# Patient Record
Sex: FEMALE | Race: WHITE | NOT HISPANIC OR LATINO | URBAN - METROPOLITAN AREA
[De-identification: names, ages, dates, MRNs, and addresses within clinical notes are randomized per-mention and may not be internally consistent; named-entity substitution may affect disease eponyms.]

---

## 2020-06-08 ENCOUNTER — INPATIENT (INPATIENT)
Facility: HOSPITAL | Age: 80
LOS: 3 days | Discharge: NURSING, REHAB OR LONG-TERM CARE | DRG: 493 | End: 2020-06-12
Attending: ORTHOPAEDIC SURGERY | Admitting: ORTHOPAEDIC SURGERY
Payer: MEDICARE

## 2020-06-08 VITALS
WEIGHT: 115.96 LBS | HEART RATE: 82 BPM | SYSTOLIC BLOOD PRESSURE: 118 MMHG | RESPIRATION RATE: 18 BRPM | HEIGHT: 61 IN | DIASTOLIC BLOOD PRESSURE: 72 MMHG | TEMPERATURE: 98 F | OXYGEN SATURATION: 96 %

## 2020-06-08 DIAGNOSIS — R52 PAIN, UNSPECIFIED: ICD-10-CM

## 2020-06-08 DIAGNOSIS — K21.9 GASTRO-ESOPHAGEAL REFLUX DISEASE WITHOUT ESOPHAGITIS: ICD-10-CM

## 2020-06-08 DIAGNOSIS — S42.309A UNSPECIFIED FRACTURE OF SHAFT OF HUMERUS, UNSPECIFIED ARM, INITIAL ENCOUNTER FOR CLOSED FRACTURE: ICD-10-CM

## 2020-06-08 LAB
ALBUMIN SERPL ELPH-MCNC: 4.2 G/DL — SIGNIFICANT CHANGE UP (ref 3.3–5)
ALP SERPL-CCNC: 67 U/L — SIGNIFICANT CHANGE UP (ref 40–120)
ALT FLD-CCNC: 20 U/L — SIGNIFICANT CHANGE UP (ref 10–45)
ANION GAP SERPL CALC-SCNC: 17 MMOL/L — SIGNIFICANT CHANGE UP (ref 5–17)
APTT BLD: 27 SEC — LOW (ref 27.5–36.3)
AST SERPL-CCNC: 25 U/L — SIGNIFICANT CHANGE UP (ref 10–40)
BASOPHILS # BLD AUTO: 0.05 K/UL — SIGNIFICANT CHANGE UP (ref 0–0.2)
BASOPHILS NFR BLD AUTO: 0.6 % — SIGNIFICANT CHANGE UP (ref 0–2)
BILIRUB SERPL-MCNC: 0.8 MG/DL — SIGNIFICANT CHANGE UP (ref 0.2–1.2)
BLD GP AB SCN SERPL QL: NEGATIVE — SIGNIFICANT CHANGE UP
BUN SERPL-MCNC: 11 MG/DL — SIGNIFICANT CHANGE UP (ref 7–23)
CALCIUM SERPL-MCNC: 9.7 MG/DL — SIGNIFICANT CHANGE UP (ref 8.4–10.5)
CHLORIDE SERPL-SCNC: 99 MMOL/L — SIGNIFICANT CHANGE UP (ref 96–108)
CO2 SERPL-SCNC: 25 MMOL/L — SIGNIFICANT CHANGE UP (ref 22–31)
CREAT SERPL-MCNC: 0.71 MG/DL — SIGNIFICANT CHANGE UP (ref 0.5–1.3)
EOSINOPHIL # BLD AUTO: 0.03 K/UL — SIGNIFICANT CHANGE UP (ref 0–0.5)
EOSINOPHIL NFR BLD AUTO: 0.4 % — SIGNIFICANT CHANGE UP (ref 0–6)
GLUCOSE SERPL-MCNC: 74 MG/DL — SIGNIFICANT CHANGE UP (ref 70–99)
HCT VFR BLD CALC: 37.2 % — SIGNIFICANT CHANGE UP (ref 34.5–45)
HGB BLD-MCNC: 11.9 G/DL — SIGNIFICANT CHANGE UP (ref 11.5–15.5)
IMM GRANULOCYTES NFR BLD AUTO: 0.4 % — SIGNIFICANT CHANGE UP (ref 0–1.5)
INR BLD: 0.97 RATIO — SIGNIFICANT CHANGE UP (ref 0.88–1.16)
LYMPHOCYTES # BLD AUTO: 2.1 K/UL — SIGNIFICANT CHANGE UP (ref 1–3.3)
LYMPHOCYTES # BLD AUTO: 26.4 % — SIGNIFICANT CHANGE UP (ref 13–44)
MCHC RBC-ENTMCNC: 30.9 PG — SIGNIFICANT CHANGE UP (ref 27–34)
MCHC RBC-ENTMCNC: 32 GM/DL — SIGNIFICANT CHANGE UP (ref 32–36)
MCV RBC AUTO: 96.6 FL — SIGNIFICANT CHANGE UP (ref 80–100)
MONOCYTES # BLD AUTO: 0.66 K/UL — SIGNIFICANT CHANGE UP (ref 0–0.9)
MONOCYTES NFR BLD AUTO: 8.3 % — SIGNIFICANT CHANGE UP (ref 2–14)
NEUTROPHILS # BLD AUTO: 5.09 K/UL — SIGNIFICANT CHANGE UP (ref 1.8–7.4)
NEUTROPHILS NFR BLD AUTO: 63.9 % — SIGNIFICANT CHANGE UP (ref 43–77)
NRBC # BLD: 0 /100 WBCS — SIGNIFICANT CHANGE UP (ref 0–0)
PLATELET # BLD AUTO: 266 K/UL — SIGNIFICANT CHANGE UP (ref 150–400)
POTASSIUM SERPL-MCNC: 3.4 MMOL/L — LOW (ref 3.5–5.3)
POTASSIUM SERPL-SCNC: 3.4 MMOL/L — LOW (ref 3.5–5.3)
PROT SERPL-MCNC: 6.9 G/DL — SIGNIFICANT CHANGE UP (ref 6–8.3)
PROTHROM AB SERPL-ACNC: 11.2 SEC — SIGNIFICANT CHANGE UP (ref 10–12.9)
RBC # BLD: 3.85 M/UL — SIGNIFICANT CHANGE UP (ref 3.8–5.2)
RBC # FLD: 13.1 % — SIGNIFICANT CHANGE UP (ref 10.3–14.5)
RH IG SCN BLD-IMP: POSITIVE — SIGNIFICANT CHANGE UP
RH IG SCN BLD-IMP: POSITIVE — SIGNIFICANT CHANGE UP
SODIUM SERPL-SCNC: 141 MMOL/L — SIGNIFICANT CHANGE UP (ref 135–145)
WBC # BLD: 7.96 K/UL — SIGNIFICANT CHANGE UP (ref 3.8–10.5)
WBC # FLD AUTO: 7.96 K/UL — SIGNIFICANT CHANGE UP (ref 3.8–10.5)

## 2020-06-08 PROCEDURE — 73020 X-RAY EXAM OF SHOULDER: CPT | Mod: 26,LT

## 2020-06-08 PROCEDURE — 99285 EMERGENCY DEPT VISIT HI MDM: CPT | Mod: CS,GC

## 2020-06-08 PROCEDURE — 73200 CT UPPER EXTREMITY W/O DYE: CPT | Mod: 26,LT

## 2020-06-08 PROCEDURE — 71045 X-RAY EXAM CHEST 1 VIEW: CPT | Mod: 26

## 2020-06-08 PROCEDURE — 76377 3D RENDER W/INTRP POSTPROCES: CPT | Mod: 26

## 2020-06-08 PROCEDURE — 73060 X-RAY EXAM OF HUMERUS: CPT | Mod: 26,LT

## 2020-06-08 PROCEDURE — 73030 X-RAY EXAM OF SHOULDER: CPT | Mod: 26,LT

## 2020-06-08 RX ORDER — OXYCODONE HYDROCHLORIDE 5 MG/1
10 TABLET ORAL EVERY 4 HOURS
Refills: 0 | Status: DISCONTINUED | OUTPATIENT
Start: 2020-06-08 | End: 2020-06-09

## 2020-06-08 RX ORDER — POTASSIUM CHLORIDE 20 MEQ
20 PACKET (EA) ORAL
Refills: 0 | Status: COMPLETED | OUTPATIENT
Start: 2020-06-08 | End: 2020-06-08

## 2020-06-08 RX ORDER — ASCORBIC ACID 60 MG
500 TABLET,CHEWABLE ORAL
Refills: 0 | Status: DISCONTINUED | OUTPATIENT
Start: 2020-06-08 | End: 2020-06-09

## 2020-06-08 RX ORDER — FERROUS SULFATE 325(65) MG
325 TABLET ORAL
Refills: 0 | Status: DISCONTINUED | OUTPATIENT
Start: 2020-06-08 | End: 2020-06-09

## 2020-06-08 RX ORDER — OXYCODONE HYDROCHLORIDE 5 MG/1
5 TABLET ORAL EVERY 4 HOURS
Refills: 0 | Status: DISCONTINUED | OUTPATIENT
Start: 2020-06-08 | End: 2020-06-09

## 2020-06-08 RX ORDER — MAGNESIUM HYDROXIDE 400 MG/1
30 TABLET, CHEWABLE ORAL DAILY
Refills: 0 | Status: DISCONTINUED | OUTPATIENT
Start: 2020-06-08 | End: 2020-06-09

## 2020-06-08 RX ORDER — FOLIC ACID 0.8 MG
1 TABLET ORAL DAILY
Refills: 0 | Status: DISCONTINUED | OUTPATIENT
Start: 2020-06-08 | End: 2020-06-09

## 2020-06-08 RX ORDER — HYDROMORPHONE HYDROCHLORIDE 2 MG/ML
0.5 INJECTION INTRAMUSCULAR; INTRAVENOUS; SUBCUTANEOUS EVERY 6 HOURS
Refills: 0 | Status: DISCONTINUED | OUTPATIENT
Start: 2020-06-08 | End: 2020-06-09

## 2020-06-08 RX ORDER — SODIUM CHLORIDE 9 MG/ML
1000 INJECTION, SOLUTION INTRAVENOUS
Refills: 0 | Status: DISCONTINUED | OUTPATIENT
Start: 2020-06-08 | End: 2020-06-09

## 2020-06-08 RX ADMIN — Medication 20 MILLIEQUIVALENT(S): at 17:31

## 2020-06-08 RX ADMIN — Medication 20 MILLIEQUIVALENT(S): at 16:05

## 2020-06-08 RX ADMIN — Medication 500 MILLIGRAM(S): at 21:52

## 2020-06-08 RX ADMIN — Medication 20 MILLIEQUIVALENT(S): at 14:40

## 2020-06-08 RX ADMIN — Medication 325 MILLIGRAM(S): at 17:30

## 2020-06-08 RX ADMIN — HYDROMORPHONE HYDROCHLORIDE 0.5 MILLIGRAM(S): 2 INJECTION INTRAMUSCULAR; INTRAVENOUS; SUBCUTANEOUS at 15:59

## 2020-06-08 NOTE — ED PROVIDER NOTE - CARE PLAN
Principal Discharge DX:	Humeral fracture Principal Discharge DX:	Humerus head fracture, left, closed, initial encounter

## 2020-06-08 NOTE — H&P ADULT - NSHPLABSRESULTS_GEN_ALL_CORE
Imaging:  XR left shoulder/humerus demonstrates displaced two-part proximal humerus fracture involving the surgical neck. No other appreciable fractures/dislocations

## 2020-06-08 NOTE — ED ADULT NURSE NOTE - OBJECTIVE STATEMENT
79y female presents to the ED from outpatient MD c/o left arm and shoulder pain with a diagnosis of a fractured humerus. The patient lost her balance and fell at home on Thursday June 4th, visited outside hospital where xrays revealed the fracture. Patient was discharged home and referred to an outpatient MD. Patient presents with bruising on both upper and lower parts of the left arm with pain upon movement and palpation. Patient denies any numbness and maintains mobility from the elbow down. Sensation remains in tact. Patient denies taking any pain medication today. Pt is A&O x3, lung sounds clear bilaterally, abd soft, nondistended and nontender. PMH includes HEP C. 79y female presents to the ED from outpatient MD c/o left arm and shoulder pain with a diagnosis of a fractured humerus. The patient lost her balance and fell at home on Thursday June 4th, visited outside hospital where xrays revealed the fracture. Patient was discharged home and referred to an outpatient MD. Patient presents with bruising on both upper and lower parts of the left arm with pain upon movement and palpation. Patient denies any numbness and maintains mobility from the elbow down. Sensation remains in tact. Patient denies cp and SOB. Pt denies taking any pain medication today. Pt is A&O x3, lung sounds clear bilaterally, abd soft, nondistended and nontender. PMH includes HEP C.

## 2020-06-08 NOTE — ED PROVIDER NOTE - PHYSICAL EXAMINATION
Gen: well developed South African speaking female NAD   HEENT: NCAT, EOMI, no nasal discharge, mucous membranes moist  CV: RRR, +S1/S2, no M/R/G  Resp: CTAB, no W/R/R  GI: Abdomen soft non-distended, NTTP, no masses  MSK: +L proximal upper arm/shoulder with palpable deformity, +ecchymosis to left upper and forearm, +distal sensation intact in median, radial and ulnar distribution, distal pulses 2+ b/l arms, cap refill <2s +AROM of b/l phalanges intact   Neuro: A&Ox4, following commands, moving all four extremities spontaneously  Psych: appropriate mood

## 2020-06-08 NOTE — H&P ADULT - ASSESSMENT
A/P:  Patient is a 79y Female w/ left proximal humerus fx    -Plan for OR tomorrow w/ Dr. Parker for ORIF.  -NPO after midnight except medications  -IVF while NPO  -Please hold chemical DVT ppx, SCDs okay  -FU Preop labs  -Medical comanagement appreciated, please comment on patients optimization/clearance for OR  -Multimodal Analgesia - recommend low dose opioids and acetaminophen as tolerated  -NWB, LUE in sling  -Ice and elevate as tolerated  -Recommend Ca & Vit D supplementation  -Recommend DEXA scan/Osteoporosis workup outpatient and treatment as needed  -Recommend follow up w/ outpatient endocrinologist   -All Patient's and Family Member's questions answered. Patient/family understand and agree w/ plan.  -Imaging and clinical presentation discussed w/ Dr. Parker who is aware and agrees w/ above plan.    Quinten Lopez M.D.   Orthopaedic Surgery  [id number]

## 2020-06-08 NOTE — CONSULT NOTE ADULT - ASSESSMENT
80 yo woma with a hx of a fall presents with a left proximal humerus fx. Patient scheduled for Open reduction and internal fixation

## 2020-06-08 NOTE — CONSULT NOTE ADULT - SUBJECTIVE AND OBJECTIVE BOX
78 y/o F no PMH presents to the ED with c/o L arm pain, bruising and swelling s/p fall last week. Pt was seen at [facility], had cardiac w/u and XR which showed humeral fx, pt placed in sling and given outpt f/u for this week. Pt saw her PMD today who referred her to [facility] ED for eval and likely admit with Dr. Parker for surgery. Denies acutely worsening pain, numbness, tingling to arm, fevers, chills, n/v/d. Has been taking tylenol for pain at home. Patient scheduled for an Open reduction and internal fixation of the left humerus. Patient seen resting comfirtably. Denies any chest pain or shortness of breath.        PAST MEDICAL & SURGICAL HISTORY:  Constipation  No significant past surgical history        MEDICATIONS  (STANDING):  ascorbic acid 500 milliGRAM(s) Oral two times a day  dicyclomine 20 milliGRAM(s) Oral daily  ferrous    sulfate 325 milliGRAM(s) Oral three times a day with meals  folic acid 1 milliGRAM(s) Oral daily  lactated ringers. 1000 milliLiter(s) (100 mL/Hr) IV Continuous <Continuous>  multivitamin 1 Tablet(s) Oral daily  potassium chloride    Tablet ER 20 milliEquivalent(s) Oral every 2 hours    MEDICATIONS  (PRN):  HYDROmorphone  Injectable 0.5 milliGRAM(s) IV Push every 6 hours PRN Severe Pain (7 - 10)  magnesium hydroxide Suspension 30 milliLiter(s) Oral daily PRN Constipation  oxyCODONE    IR 10 milliGRAM(s) Oral every 4 hours PRN Moderate Pain (4 - 6)  oxyCODONE    IR 5 milliGRAM(s) Oral every 4 hours PRN Mild Pain (1 - 3)    Social Hx:  Tobacco: Neg  ETOH: Rare;y  Drugs: Neg    Family Hx:  As per my conversation with the patient, non contributory        ROS  CONSTITUTIONAL: No weakness, fevers or chills  EYES/ENT: No visual changes;  No vertigo or throat pain   NECK: No pain or stiffness  RESPIRATORY: No cough, wheezing, hemoptysis; No shortness of breath  CARDIOVASCULAR: No chest pain or palpitations  GASTROINTESTINAL: No abdominal or epigastric pain. No nausea, vomiting, or hematemesis; No diarrhea or constipation. No melena or hematochezia.  GENITOURINARY: No dysuria, frequency or hematuria  NEUROLOGICAL: No numbness or weakness  SKIN: No itching, burning, rashes, or lesions   MUSCULOSKELETAL: left arm pain    INTERVAL HPI/OVERNIGHT EVENTS:  T(C): 36.8 (06-08-20 @ 15:54), Max: 36.8 (06-08-20 @ 15:54)  HR: 76 (06-08-20 @ 15:54) (76 - 82)  BP: 103/66 (06-08-20 @ 15:54) (103/66 - 118/72)  RR: 18 (06-08-20 @ 15:54) (18 - 18)  SpO2: 98% (06-08-20 @ 15:54) (96% - 98%)  Wt(kg): --  I&O's Summary      PHYSICAL EXAM:  GENERAL: NAD, well-groomed, well-developed  HEAD:  Atraumatic, Normocephalic  EYES: EOMI, PERRLA, conjunctiva and sclera clear  ENMT: No tonsillar erythema, exudates, or enlargement; Moist mucous membranes, Good dentition, No lesions  NECK: Supple, No JVD, Normal thyroid  NERVOUS SYSTEM:  Alert & Oriented X3, Good concentration; Motor Strength 5/5 B/L upper and lower extremities; DTRs 2+ intact and symmetric  CHEST/LUNG: Clear to percussion bilaterally; No rales, rhonchi, wheezing, or rubs  HEART: Regular rate and rhythm; No murmurs, rubs, or gallops  ABDOMEN: Soft, Nontender, Nondistended; Bowel sounds present  EXTREMITIES:  2+ Peripheral Pulses, No clubbing, cyanosis, or edema  LYMPH: No lymphadenopathy noted  SKIN: No rashes or lesions        LABS:                        11.9   7.96  )-----------( 266      ( 08 Jun 2020 12:59 )             37.2     06-08    141  |  99  |  11  ----------------------------<  74  3.4<L>   |  25  |  0.71    Ca    9.7      08 Jun 2020 12:59    TPro  6.9  /  Alb  4.2  /  TBili  0.8  /  DBili  x   /  AST  25  /  ALT  20  /  AlkPhos  67  06-08    PT/INR - ( 08 Jun 2020 12:59 )   PT: 11.2 sec;   INR: 0.97 ratio         PTT - ( 08 Jun 2020 12:59 )  PTT:27.0 sec    CAPILLARY BLOOD GLUCOSE    EKG   NSR@ 80

## 2020-06-08 NOTE — H&P ADULT - NSHPPHYSICALEXAM_GEN_ALL_CORE
PHYSICAL EXAM:  T(C): 36.7 (06-08-20 @ 11:48), Max: 36.7 (06-08-20 @ 11:48)  HR: 82 (06-08-20 @ 11:48) (82 - 82)  BP: 118/72 (06-08-20 @ 11:48) (118/72 - 118/72)  RR: 18 (06-08-20 @ 11:48) (18 - 18)  SpO2: 96% (06-08-20 @ 11:48) (96% - 96%)    Gen: NAD, Resting, following commands  LUE:  Skin intact, no erythema, + ecchymosis about the shoulder & arm  +TTP about the shoulder  +AIN/PIN/R/M/Msc/Ax  +SILT C5-T1  + Rad pulse  Compartments soft and compressible    Secondary Survey:   No TTP over bony prominences, SILT, palpable pulses, full/painless A/PROM, compartments soft. No TTP over spinous processes or paraspinal muscles at C/T/L spine. No palpable step off. No other injuries or complaints.

## 2020-06-08 NOTE — H&P ADULT - HISTORY OF PRESENT ILLNESS
Patient is a 79yFemale community ambulator with assistive devices who presents to [facility] ED w/ a c/o of left shoulder pain s/p MF. Patient states slipped and fell on thursday. Denies HS/LOC. Localizing pain to left shoulder, denies radiation of pain elsewhere. States ability to ambulate immediately following the injury. Denies any numbness or tingling. Denies having any other pain elsewhere. Denies any previous orthopaedic history. No other orthopaedic concerns at this time.

## 2020-06-08 NOTE — ED PROVIDER NOTE - CLINICAL SUMMARY MEDICAL DECISION MAKING FREE TEXT BOX
78 y/o F no PMH presents to the ED from her PMD with left humeral sp fall last week, likely admit to Dr. Parker for surgery. Denies acutely worsening pain, numbness, tingling to arm, fevers, chills, n/v/d. Has been taking tylenol for pain at home. Compartments soft, distal sensation intact and equal, radial pulses intact and equal b/l. DDx humeral fx, dislocation, contusion, low suspicion compartment syndrome. plan labs xrays ortho consult & admit

## 2020-06-08 NOTE — ED PROVIDER NOTE - ATTENDING CONTRIBUTION TO CARE
I, Stan Edwards, performed a history and physical exam of the patient and discussed their management with the resident and /or advanced care provider. I reviewed the resident and /or ACP's note and agree with the documented findings and plan of care. I was present and available for all procedures.  Patient stable with injury to left humerus previously seen at another institution.  Unable to obtain xrays from [facility].  Patient sent to ED by PMD.  Will evaluate xrays and consult ortho as indicated.  Patient  in a sling and pain free without movement.

## 2020-06-09 LAB
ANION GAP SERPL CALC-SCNC: 14 MMOL/L — SIGNIFICANT CHANGE UP (ref 5–17)
ANION GAP SERPL CALC-SCNC: 21 MMOL/L — HIGH (ref 5–17)
APPEARANCE UR: CLEAR — SIGNIFICANT CHANGE UP
APTT BLD: 28.3 SEC — SIGNIFICANT CHANGE UP (ref 27.5–36.3)
BILIRUB UR-MCNC: NEGATIVE — SIGNIFICANT CHANGE UP
BUN SERPL-MCNC: 10 MG/DL — SIGNIFICANT CHANGE UP (ref 7–23)
BUN SERPL-MCNC: 8 MG/DL — SIGNIFICANT CHANGE UP (ref 7–23)
CALCIUM SERPL-MCNC: 8.8 MG/DL — SIGNIFICANT CHANGE UP (ref 8.4–10.5)
CALCIUM SERPL-MCNC: 9.6 MG/DL — SIGNIFICANT CHANGE UP (ref 8.4–10.5)
CHLORIDE SERPL-SCNC: 100 MMOL/L — SIGNIFICANT CHANGE UP (ref 96–108)
CHLORIDE SERPL-SCNC: 98 MMOL/L — SIGNIFICANT CHANGE UP (ref 96–108)
CO2 SERPL-SCNC: 20 MMOL/L — LOW (ref 22–31)
CO2 SERPL-SCNC: 26 MMOL/L — SIGNIFICANT CHANGE UP (ref 22–31)
COLOR SPEC: YELLOW — SIGNIFICANT CHANGE UP
CREAT SERPL-MCNC: 0.62 MG/DL — SIGNIFICANT CHANGE UP (ref 0.5–1.3)
CREAT SERPL-MCNC: 0.7 MG/DL — SIGNIFICANT CHANGE UP (ref 0.5–1.3)
DIFF PNL FLD: NEGATIVE — SIGNIFICANT CHANGE UP
GLUCOSE SERPL-MCNC: 133 MG/DL — HIGH (ref 70–99)
GLUCOSE SERPL-MCNC: 77 MG/DL — SIGNIFICANT CHANGE UP (ref 70–99)
GLUCOSE UR QL: NEGATIVE — SIGNIFICANT CHANGE UP
HCT VFR BLD CALC: 35.7 % — SIGNIFICANT CHANGE UP (ref 34.5–45)
HCT VFR BLD CALC: 37 % — SIGNIFICANT CHANGE UP (ref 34.5–45)
HGB BLD-MCNC: 11.5 G/DL — SIGNIFICANT CHANGE UP (ref 11.5–15.5)
HGB BLD-MCNC: 12.1 G/DL — SIGNIFICANT CHANGE UP (ref 11.5–15.5)
INR BLD: 0.96 RATIO — SIGNIFICANT CHANGE UP (ref 0.88–1.16)
KETONES UR-MCNC: ABNORMAL
LEUKOCYTE ESTERASE UR-ACNC: NEGATIVE — SIGNIFICANT CHANGE UP
MCHC RBC-ENTMCNC: 31.5 PG — SIGNIFICANT CHANGE UP (ref 27–34)
MCHC RBC-ENTMCNC: 31.8 PG — SIGNIFICANT CHANGE UP (ref 27–34)
MCHC RBC-ENTMCNC: 32.2 GM/DL — SIGNIFICANT CHANGE UP (ref 32–36)
MCHC RBC-ENTMCNC: 32.7 GM/DL — SIGNIFICANT CHANGE UP (ref 32–36)
MCV RBC AUTO: 97.1 FL — SIGNIFICANT CHANGE UP (ref 80–100)
MCV RBC AUTO: 97.8 FL — SIGNIFICANT CHANGE UP (ref 80–100)
NITRITE UR-MCNC: NEGATIVE — SIGNIFICANT CHANGE UP
NRBC # BLD: 0 /100 WBCS — SIGNIFICANT CHANGE UP (ref 0–0)
NRBC # BLD: 0 /100 WBCS — SIGNIFICANT CHANGE UP (ref 0–0)
PH UR: 6 — SIGNIFICANT CHANGE UP (ref 5–8)
PLATELET # BLD AUTO: 248 K/UL — SIGNIFICANT CHANGE UP (ref 150–400)
PLATELET # BLD AUTO: 262 K/UL — SIGNIFICANT CHANGE UP (ref 150–400)
POTASSIUM SERPL-MCNC: 3.5 MMOL/L — SIGNIFICANT CHANGE UP (ref 3.5–5.3)
POTASSIUM SERPL-MCNC: 4.3 MMOL/L — SIGNIFICANT CHANGE UP (ref 3.5–5.3)
POTASSIUM SERPL-SCNC: 3.5 MMOL/L — SIGNIFICANT CHANGE UP (ref 3.5–5.3)
POTASSIUM SERPL-SCNC: 4.3 MMOL/L — SIGNIFICANT CHANGE UP (ref 3.5–5.3)
PROT UR-MCNC: ABNORMAL
PROTHROM AB SERPL-ACNC: 11 SEC — SIGNIFICANT CHANGE UP (ref 10–12.9)
RBC # BLD: 3.65 M/UL — LOW (ref 3.8–5.2)
RBC # BLD: 3.81 M/UL — SIGNIFICANT CHANGE UP (ref 3.8–5.2)
RBC # FLD: 13 % — SIGNIFICANT CHANGE UP (ref 10.3–14.5)
RBC # FLD: 13.1 % — SIGNIFICANT CHANGE UP (ref 10.3–14.5)
SARS-COV-2 RNA SPEC QL NAA+PROBE: SIGNIFICANT CHANGE UP
SARS-COV-2 RNA SPEC QL NAA+PROBE: SIGNIFICANT CHANGE UP
SODIUM SERPL-SCNC: 138 MMOL/L — SIGNIFICANT CHANGE UP (ref 135–145)
SODIUM SERPL-SCNC: 141 MMOL/L — SIGNIFICANT CHANGE UP (ref 135–145)
SP GR SPEC: 1.03 — HIGH (ref 1.01–1.02)
UROBILINOGEN FLD QL: ABNORMAL
WBC # BLD: 6.87 K/UL — SIGNIFICANT CHANGE UP (ref 3.8–10.5)
WBC # BLD: 7.98 K/UL — SIGNIFICANT CHANGE UP (ref 3.8–10.5)
WBC # FLD AUTO: 6.87 K/UL — SIGNIFICANT CHANGE UP (ref 3.8–10.5)
WBC # FLD AUTO: 7.98 K/UL — SIGNIFICANT CHANGE UP (ref 3.8–10.5)

## 2020-06-09 PROCEDURE — 73030 X-RAY EXAM OF SHOULDER: CPT | Mod: 26,LT

## 2020-06-09 RX ORDER — HEPARIN SODIUM 5000 [USP'U]/ML
5000 INJECTION INTRAVENOUS; SUBCUTANEOUS EVERY 8 HOURS
Refills: 0 | Status: DISCONTINUED | OUTPATIENT
Start: 2020-06-09 | End: 2020-06-12

## 2020-06-09 RX ORDER — LANOLIN ALCOHOL/MO/W.PET/CERES
3 CREAM (GRAM) TOPICAL AT BEDTIME
Refills: 0 | Status: DISCONTINUED | OUTPATIENT
Start: 2020-06-09 | End: 2020-06-12

## 2020-06-09 RX ORDER — ACETAMINOPHEN 500 MG
975 TABLET ORAL EVERY 8 HOURS
Refills: 0 | Status: DISCONTINUED | OUTPATIENT
Start: 2020-06-09 | End: 2020-06-12

## 2020-06-09 RX ORDER — SODIUM CHLORIDE 9 MG/ML
1000 INJECTION, SOLUTION INTRAVENOUS
Refills: 0 | Status: DISCONTINUED | OUTPATIENT
Start: 2020-06-09 | End: 2020-06-11

## 2020-06-09 RX ORDER — OXYCODONE HYDROCHLORIDE 5 MG/1
2.5 TABLET ORAL EVERY 4 HOURS
Refills: 0 | Status: DISCONTINUED | OUTPATIENT
Start: 2020-06-09 | End: 2020-06-12

## 2020-06-09 RX ORDER — SODIUM CHLORIDE 9 MG/ML
500 INJECTION INTRAMUSCULAR; INTRAVENOUS; SUBCUTANEOUS ONCE
Refills: 0 | Status: COMPLETED | OUTPATIENT
Start: 2020-06-09 | End: 2020-06-09

## 2020-06-09 RX ORDER — ONDANSETRON 8 MG/1
4 TABLET, FILM COATED ORAL EVERY 6 HOURS
Refills: 0 | Status: DISCONTINUED | OUTPATIENT
Start: 2020-06-09 | End: 2020-06-12

## 2020-06-09 RX ORDER — BENZOCAINE AND MENTHOL 5; 1 G/100ML; G/100ML
1 LIQUID ORAL
Refills: 0 | Status: DISCONTINUED | OUTPATIENT
Start: 2020-06-09 | End: 2020-06-12

## 2020-06-09 RX ORDER — SENNA PLUS 8.6 MG/1
2 TABLET ORAL AT BEDTIME
Refills: 0 | Status: DISCONTINUED | OUTPATIENT
Start: 2020-06-09 | End: 2020-06-12

## 2020-06-09 RX ORDER — ONDANSETRON 8 MG/1
4 TABLET, FILM COATED ORAL ONCE
Refills: 0 | Status: DISCONTINUED | OUTPATIENT
Start: 2020-06-09 | End: 2020-06-09

## 2020-06-09 RX ORDER — VANCOMYCIN HCL 1 G
750 VIAL (EA) INTRAVENOUS ONCE
Refills: 0 | Status: COMPLETED | OUTPATIENT
Start: 2020-06-09 | End: 2020-06-09

## 2020-06-09 RX ORDER — HYDROMORPHONE HYDROCHLORIDE 2 MG/ML
0.25 INJECTION INTRAMUSCULAR; INTRAVENOUS; SUBCUTANEOUS
Refills: 0 | Status: DISCONTINUED | OUTPATIENT
Start: 2020-06-09 | End: 2020-06-09

## 2020-06-09 RX ORDER — OXYCODONE HYDROCHLORIDE 5 MG/1
5 TABLET ORAL EVERY 4 HOURS
Refills: 0 | Status: DISCONTINUED | OUTPATIENT
Start: 2020-06-09 | End: 2020-06-12

## 2020-06-09 RX ADMIN — Medication 975 MILLIGRAM(S): at 23:08

## 2020-06-09 RX ADMIN — SODIUM CHLORIDE 75 MILLILITER(S): 9 INJECTION, SOLUTION INTRAVENOUS at 18:54

## 2020-06-09 RX ADMIN — SODIUM CHLORIDE 1000 MILLILITER(S): 9 INJECTION INTRAMUSCULAR; INTRAVENOUS; SUBCUTANEOUS at 21:26

## 2020-06-09 RX ADMIN — SODIUM CHLORIDE 125 MILLILITER(S): 9 INJECTION, SOLUTION INTRAVENOUS at 21:26

## 2020-06-09 RX ADMIN — SENNA PLUS 2 TABLET(S): 8.6 TABLET ORAL at 23:08

## 2020-06-09 RX ADMIN — SODIUM CHLORIDE 75 MILLILITER(S): 9 INJECTION, SOLUTION INTRAVENOUS at 16:06

## 2020-06-09 RX ADMIN — HEPARIN SODIUM 5000 UNIT(S): 5000 INJECTION INTRAVENOUS; SUBCUTANEOUS at 23:08

## 2020-06-09 RX ADMIN — SODIUM CHLORIDE 100 MILLILITER(S): 9 INJECTION, SOLUTION INTRAVENOUS at 04:42

## 2020-06-09 RX ADMIN — Medication 250 MILLIGRAM(S): at 22:54

## 2020-06-09 NOTE — PROGRESS NOTE ADULT - ASSESSMENT
80 yo woma with a hx of a fall presents with a left proximal humerus fx. Patient underwent  Open reduction and internal fixation of hip. 78 yo woma with a hx of a fall presents with a left proximal humerus fx. Patient underwent  Open reduction and internal fixation of humerus.

## 2020-06-09 NOTE — PROGRESS NOTE ADULT - PROBLEM SELECTOR PLAN 3
[place] diet and omeprazol am Pepcid pm hip surge [place] diet and Omeprazole am Pepcid pm humerus surgery

## 2020-06-09 NOTE — CHART NOTE - NSCHARTNOTEFT_GEN_A_CORE
Ortho POC:    Patient tolerated surgery well.  Denies any pain.  Has had hypotensive readings, but asymptomatic.  Denies chest pain, light headedness, dizziness.    T(C): 36.4 (06-09-20 @ 21:30)  HR: 65 (06-09-20 @ 21:30)  BP: 86/46 (06-09-20 @ 21:30)  RR: 18 (06-09-20 @ 21:30)  SpO2: 95% (06-09-20 @ 21:30)  Wt(kg): --    Gen: NAD  Resp: Unlabored breathing  PE LUE:  Skin intact,    SILT axillary/med/rad/ulnar  +Motor AIN/PIN/Ulnar/Radial/Musc/Median,   +painless elbow/wrist ROM,   shoulder ROM limited 2/2 pain,   2+radial pulse, soft compartments.        pain control  NWB LUE in sling  PT/OT  will continue to monitor vitals  500cc bolus  increase ivf from 75 to 125

## 2020-06-09 NOTE — PROGRESS NOTE ADULT - SUBJECTIVE AND OBJECTIVE BOX
80 y/o F no PMH presents to the ED with c/o L arm pain, bruising and swelling s/p fall last week. Pt was seen at [facility], had cardiac w/u and XR which showed humeral fx, pt placed in sling and given outpt f/u for this week. Pt saw her PMD today who referred her to [facility] ED for eval and likely admit with Dr. Parker for surgery. Denies acutely worsening pain, numbness, tingling to arm, fevers, chills, n/v/d. Has been taking tylenol for pain at home. Patient scheduled for an Open reduction and internal fixation of the left humerus. Patient seen resting comfirtably. Denies any chest pain or shortness of breath incentive spirometry        PAST MEDICAL & SURGICAL HISTORY:  Constipation  No significant past surgical history        MEDICATIONS  (STANDING):  ascorbic acid 500 milliGRAM(s) Oral two times a day  dicyclomine 20 milliGRAM(s) Oral daily  ferrous    sulfate 325 milliGRAM(s) Oral three times a day with meals  folic acid 1 milliGRAM(s) Oral daily  lactated ringers. 1000 milliLiter(s) (100 mL/Hr) IV Continuous <Continuous>  multivitamin 1 Tablet(s) Oral daily  potassium chloride    Tablet ER 20 milliEquivalent(s) Oral every 2 hours    MEDICATIONS  (PRN):  HYDROmorphone  Injectable 0.5 milliGRAM(s) IV Push every 6 hours PRN Severe Pain (7 - 10)  magnesium hydroxide Suspension 30 milliLiter(s) Oral daily PRN Constipation  oxyCODONE    IR 10 milliGRAM(s) Oral every 4 hours PRN Moderate Pain (4 - 6)  oxyCODONE    IR 5 milliGRAM(s) Oral every 4 hours PRN Mild Pain (1 - 3)    Social Hx:  Tobacco: Neg  ETOH: Rare;y  Drugs: Neg    Family Hx:  As per my conversation with the patient, non contributory      ICU Vital Signs Last 24 Hrs  T(C): 36.4 (2020 23:30), Max: 36.7 (2020 19:00)  T(F): 97.5 (2020 23:30), Max: 98.1 (2020 19:00)  HR: 71 (2020 23:30) (63 - 106)  BP: 82/50 (2020 23:30) (82/50 - 119/79)  BP(mean): 69 (2020 17:00) (68 - 84)  ABP: --  ABP(mean): --  RR: 18 (2020 23:30) (14 - 18)  SpO2: 93% (2020 23:30) (93% - 100%)        PHYSICAL EXAM:  GENERAL: NAD, well-groomed, well-developed  HEAD:  Atraumatic, Normocephalic  EYES: EOMI, PERRLA, conjunctiva and sclera clear  ENMT: No tonsillar erythema, exudates, or enlargement; Moist mucous membranes, Good dentition, No lesions  NECK: Supple, No JVD, Normal thyroid  NERVOUS SYSTEM:  Alert & Oriented X3, Good concentration; Motor Strength 5/5 B/L upper and lower extremities; DTRs 2+ intact and symmetric  CHEST/LUNG: Clear to percussion bilaterally; No rales, rhonchi, wheezing, or rubs  HEART: Regular rate and rhythm; No murmurs, rubs, or gallops  ABDOMEN: Soft, Nontender, Nondistended; Bowel sounds present  EXTREMITIES:  2+ Peripheral Pulses, No clubbing, cyanosis, or edema  S/P     PROCEDURES:   ORIF, fracture, proximal humerus 2020 14:18:35  Dom Reyes  .                                                                                                                              LABS:            CBC Full  -  ( 2020 15:11 )  WBC Count : 7.98 K/uL  RBC Count : 3.65 M/uL  Hemoglobin : 11.5 g/dL  Hematocrit : 35.7 %  Platelet Count - Automated : 262 K/uL  Mean Cell Volume : 97.8 fl  Mean Cell Hemoglobin : 31.5 pg  Mean Cell Hemoglobin Concentration : 32.2 gm/dL  Auto Neutrophil # : x  Auto Lymphocyte # : x  Auto Monocyte # : x  Auto Eosinophil # : x  Auto Basophil # : x  Auto Neutrophil % : x  Auto Lymphocyte % : x  Auto Monocyte % : x  Auto Eosinophil % : x  Auto Basophil % : x        141  |  100  |  8   ----------------------------<  133<H>  3.5   |  20<L>  |  0.62    Ca    8.8      2020 15:11    TPro  6.9  /  Alb  4.2  /  TBili  0.8  /  DBili  x   /  AST  25  /  ALT  20  /  AlkPhos  67  06-08    LIVER FUNCTIONS - ( 2020 12:59 )  Alb: 4.2 g/dL / Pro: 6.9 g/dL / ALK PHOS: 67 U/L / ALT: 20 U/L / AST: 25 U/L / GGT: x           PT/INR - ( 2020 06:00 )   PT: 11.0 sec;   INR: 0.96 ratio         PTT - ( 2020 06:00 )  PTT:28.3 sec  Urinalysis Basic - ( 2020 06:01 )    Color: Yellow / Appearance: Clear / S.027 / pH: x  Gluc: x / Ketone: Large  / Bili: Negative / Urobili: 2 mg/dL   Blood: x / Protein: 30 mg/dL / Nitrite: Negative   Leuk Esterase: Negative / RBC: 1 /hpf / WBC 3 /HPF   Sq Epi: x / Non Sq Epi: 2 / Bacteria: Negative                            11.9   7.96  )-----------( 266      ( 2020 12:59 )             37.2     06-08    141  |  99  |  11  ----------------------------<  74  3.4<L>   |  25  |  0.71    Ca    9.7      2020 12:59    TPro  6.9  /  Alb  4.2  /  TBili  0.8  /  DBili  x   /  AST  25  /  ALT  20  /  AlkPhos  67  06-08    PT/INR - ( 2020 12:59 )   PT: 11.2 sec;   INR: 0.97 ratio         PTT - ( 2020 12:59 )  PTT:27.0 sec    CAPILLARY BLOOD GLUCOSE    EKG   NSR@ 80

## 2020-06-10 LAB
ANION GAP SERPL CALC-SCNC: 10 MMOL/L — SIGNIFICANT CHANGE UP (ref 5–17)
BASOPHILS # BLD AUTO: 0.02 K/UL — SIGNIFICANT CHANGE UP (ref 0–0.2)
BASOPHILS NFR BLD AUTO: 0.2 % — SIGNIFICANT CHANGE UP (ref 0–2)
BUN SERPL-MCNC: 8 MG/DL — SIGNIFICANT CHANGE UP (ref 7–23)
CALCIUM SERPL-MCNC: 8.9 MG/DL — SIGNIFICANT CHANGE UP (ref 8.4–10.5)
CHLORIDE SERPL-SCNC: 105 MMOL/L — SIGNIFICANT CHANGE UP (ref 96–108)
CO2 SERPL-SCNC: 27 MMOL/L — SIGNIFICANT CHANGE UP (ref 22–31)
CREAT SERPL-MCNC: 0.67 MG/DL — SIGNIFICANT CHANGE UP (ref 0.5–1.3)
EOSINOPHIL # BLD AUTO: 0.01 K/UL — SIGNIFICANT CHANGE UP (ref 0–0.5)
EOSINOPHIL NFR BLD AUTO: 0.1 % — SIGNIFICANT CHANGE UP (ref 0–6)
GLUCOSE SERPL-MCNC: 93 MG/DL — SIGNIFICANT CHANGE UP (ref 70–99)
HCT VFR BLD CALC: 29.6 % — LOW (ref 34.5–45)
HGB BLD-MCNC: 9.5 G/DL — LOW (ref 11.5–15.5)
IMM GRANULOCYTES NFR BLD AUTO: 0.6 % — SIGNIFICANT CHANGE UP (ref 0–1.5)
LYMPHOCYTES # BLD AUTO: 1.99 K/UL — SIGNIFICANT CHANGE UP (ref 1–3.3)
LYMPHOCYTES # BLD AUTO: 22.5 % — SIGNIFICANT CHANGE UP (ref 13–44)
MCHC RBC-ENTMCNC: 31.7 PG — SIGNIFICANT CHANGE UP (ref 27–34)
MCHC RBC-ENTMCNC: 32.1 GM/DL — SIGNIFICANT CHANGE UP (ref 32–36)
MCV RBC AUTO: 98.7 FL — SIGNIFICANT CHANGE UP (ref 80–100)
MONOCYTES # BLD AUTO: 1.02 K/UL — HIGH (ref 0–0.9)
MONOCYTES NFR BLD AUTO: 11.6 % — SIGNIFICANT CHANGE UP (ref 2–14)
NEUTROPHILS # BLD AUTO: 5.74 K/UL — SIGNIFICANT CHANGE UP (ref 1.8–7.4)
NEUTROPHILS NFR BLD AUTO: 65 % — SIGNIFICANT CHANGE UP (ref 43–77)
NRBC # BLD: 0 /100 WBCS — SIGNIFICANT CHANGE UP (ref 0–0)
PLATELET # BLD AUTO: 220 K/UL — SIGNIFICANT CHANGE UP (ref 150–400)
POTASSIUM SERPL-MCNC: 5 MMOL/L — SIGNIFICANT CHANGE UP (ref 3.5–5.3)
POTASSIUM SERPL-SCNC: 5 MMOL/L — SIGNIFICANT CHANGE UP (ref 3.5–5.3)
RBC # BLD: 3 M/UL — LOW (ref 3.8–5.2)
RBC # FLD: 13.1 % — SIGNIFICANT CHANGE UP (ref 10.3–14.5)
SODIUM SERPL-SCNC: 142 MMOL/L — SIGNIFICANT CHANGE UP (ref 135–145)
WBC # BLD: 8.83 K/UL — SIGNIFICANT CHANGE UP (ref 3.8–10.5)
WBC # FLD AUTO: 8.83 K/UL — SIGNIFICANT CHANGE UP (ref 3.8–10.5)

## 2020-06-10 RX ORDER — DIPHENHYDRAMINE HCL 50 MG
25 CAPSULE ORAL EVERY 4 HOURS
Refills: 0 | Status: DISCONTINUED | OUTPATIENT
Start: 2020-06-10 | End: 2020-06-12

## 2020-06-10 RX ADMIN — HEPARIN SODIUM 5000 UNIT(S): 5000 INJECTION INTRAVENOUS; SUBCUTANEOUS at 21:43

## 2020-06-10 RX ADMIN — Medication 25 MILLIGRAM(S): at 22:34

## 2020-06-10 RX ADMIN — SENNA PLUS 2 TABLET(S): 8.6 TABLET ORAL at 21:44

## 2020-06-10 RX ADMIN — Medication 975 MILLIGRAM(S): at 06:43

## 2020-06-10 RX ADMIN — HEPARIN SODIUM 5000 UNIT(S): 5000 INJECTION INTRAVENOUS; SUBCUTANEOUS at 06:39

## 2020-06-10 RX ADMIN — HEPARIN SODIUM 5000 UNIT(S): 5000 INJECTION INTRAVENOUS; SUBCUTANEOUS at 13:36

## 2020-06-10 RX ADMIN — SODIUM CHLORIDE 125 MILLILITER(S): 9 INJECTION, SOLUTION INTRAVENOUS at 21:45

## 2020-06-10 RX ADMIN — OXYCODONE HYDROCHLORIDE 5 MILLIGRAM(S): 5 TABLET ORAL at 22:37

## 2020-06-10 NOTE — OCCUPATIONAL THERAPY INITIAL EVALUATION ADULT - ADL RETRAINING, OT EVAL
Goal: Pt will perform UB/LB dressing independently using compensatory dressing technique and appropriate DME within 4 weeks.

## 2020-06-10 NOTE — OCCUPATIONAL THERAPY INITIAL EVALUATION ADULT - TRANSFER TRAINING, PT EVAL
Goal: Pt will perform sit to stand, bed <> chair, toilet, tub, shower transfers independently within 4 weeks

## 2020-06-10 NOTE — PHYSICAL THERAPY INITIAL EVALUATION ADULT - ACTIVE RANGE OF MOTION EXAMINATION, REHAB EVAL
LUE N/A due to pain, in sling/Right UE Active ROM was WFL (within functional limits)/bilateral  lower extremity Active ROM was WFL (within functional limits)

## 2020-06-10 NOTE — PROGRESS NOTE ADULT - SUBJECTIVE AND OBJECTIVE BOX
80 y/o F no PMH presents to the ED with c/o L arm pain, bruising and swelling s/p fall last week. Pt was seen at [facility], had cardiac w/u and XR which showed humeral fx, pt placed in sling and given outpt f/u for this week. Pt saw her PMD today who referred her to [facility] ED for eval and likely admit with Dr. Parker for surgery. Denies acutely worsening pain, numbness, tingling to arm, fevers, chills, n/v/d. Has been taking tylenol for pain at home. Patient scheduled for an Open reduction and internal fixation of the left humerus. Patient seen resting comfirtably. Denies any chest pain or shortness of breath incentive spirometry        PAST MEDICAL & SURGICAL HISTORY:  Constipation  No significant past surgical history        MEDICATIONS  (STANDING):  ascorbic acid 500 milliGRAM(s) Oral two times a day  dicyclomine 20 milliGRAM(s) Oral daily  ferrous    sulfate 325 milliGRAM(s) Oral three times a day with meals  folic acid 1 milliGRAM(s) Oral daily  lactated ringers. 1000 milliLiter(s) (100 mL/Hr) IV Continuous <Continuous>  multivitamin 1 Tablet(s) Oral daily  potassium chloride    Tablet ER 20 milliEquivalent(s) Oral every 2 hours    MEDICATIONS  (PRN):  HYDROmorphone  Injectable 0.5 milliGRAM(s) IV Push every 6 hours PRN Severe Pain (7 - 10)  magnesium hydroxide Suspension 30 milliLiter(s) Oral daily PRN Constipation  oxyCODONE    IR 10 milliGRAM(s) Oral every 4 hours PRN Moderate Pain (4 - 6)  oxyCODONE    IR 5 milliGRAM(s) Oral every 4 hours PRN Mild Pain (1 - 3)    Social Hx:  Tobacco: Neg  ETOH: Rare;y  Drugs: Neg    Family Hx:  As per my conversation with the patient, non contributory      ICU Vital Signs Last 24 Hrs  T(C): 36.4 (2020 23:30), Max: 36.7 (2020 19:00)  T(F): 97.5 (2020 23:30), Max: 98.1 (2020 19:00)  HR: 71 (2020 23:30) (63 - 106)  BP: 82/50 (2020 23:30) (82/50 - 119/79)  BP(mean): 69 (2020 17:00) (68 - 84)  ABP: --  ABP(mean): --  RR: 18 (2020 23:30) (14 - 18)  SpO2: 93% (2020 23:30) (93% - 100%)        PHYSICAL EXAM:  GENERAL: NAD, well-groomed, well-developed  HEAD:  Atraumatic, Normocephalic  EYES: EOMI, PERRLA, conjunctiva and sclera clear  ENMT: No tonsillar erythema, exudates, or enlargement; Moist mucous membranes, Good dentition, No lesions  NECK: Supple, No JVD, Normal thyroid  NERVOUS SYSTEM:  Alert & Oriented X3, Good concentration; Motor Strength 5/5 B/L upper and lower extremities; DTRs 2+ intact and symmetric  CHEST/LUNG: Clear to percussion bilaterally; No rales, rhonchi, wheezing, or rubs  HEART: Regular rate and rhythm; No murmurs, rubs, or gallops  ABDOMEN: Soft, Nontender, Nondistended; Bowel sounds present  EXTREMITIES:  2+ Peripheral Pulses, No clubbing, cyanosis, or edema  S/P     PROCEDURES:   ORIF, fracture, proximal humerus 2020 14:18:35  Dom Reyes  .                                                                                                                              LABS:            CBC Full  -  ( 2020 15:11 )  WBC Count : 7.98 K/uL  RBC Count : 3.65 M/uL  Hemoglobin : 11.5 g/dL  Hematocrit : 35.7 %  Platelet Count - Automated : 262 K/uL  Mean Cell Volume : 97.8 fl  Mean Cell Hemoglobin : 31.5 pg  Mean Cell Hemoglobin Concentration : 32.2 gm/dL  Auto Neutrophil # : x  Auto Lymphocyte # : x  Auto Monocyte # : x  Auto Eosinophil # : x  Auto Basophil # : x  Auto Neutrophil % : x  Auto Lymphocyte % : x  Auto Monocyte % : x  Auto Eosinophil % : x  Auto Basophil % : x        141  |  100  |  8   ----------------------------<  133<H>  3.5   |  20<L>  |  0.62    Ca    8.8      2020 15:11    TPro  6.9  /  Alb  4.2  /  TBili  0.8  /  DBili  x   /  AST  25  /  ALT  20  /  AlkPhos  67  06-08    LIVER FUNCTIONS - ( 2020 12:59 )  Alb: 4.2 g/dL / Pro: 6.9 g/dL / ALK PHOS: 67 U/L / ALT: 20 U/L / AST: 25 U/L / GGT: x           PT/INR - ( 2020 06:00 )   PT: 11.0 sec;   INR: 0.96 ratio         PTT - ( 2020 06:00 )  PTT:28.3 sec  Urinalysis Basic - ( 2020 06:01 )    Color: Yellow / Appearance: Clear / S.027 / pH: x  Gluc: x / Ketone: Large  / Bili: Negative / Urobili: 2 mg/dL   Blood: x / Protein: 30 mg/dL / Nitrite: Negative   Leuk Esterase: Negative / RBC: 1 /hpf / WBC 3 /HPF   Sq Epi: x / Non Sq Epi: 2 / Bacteria: Negative                            11.9   7.96  )-----------( 266      ( 2020 12:59 )             37.2     06-08    141  |  99  |  11  ----------------------------<  74  3.4<L>   |  25  |  0.71    Ca    9.7      2020 12:59    TPro  6.9  /  Alb  4.2  /  TBili  0.8  /  DBili  x   /  AST  25  /  ALT  20  /  AlkPhos  67  06-08    PT/INR - ( 2020 12:59 )   PT: 11.2 sec;   INR: 0.97 ratio         PTT - ( 2020 12:59 )  PTT:27.0 sec    CAPILLARY BLOOD GLUCOSE    EKG   NSR@ 80

## 2020-06-10 NOTE — PROGRESS NOTE ADULT - SUBJECTIVE AND OBJECTIVE BOX
Patient is a 79y old  Female who presents with a chief complaint of L proximal humerus fracture  Patient s/p ORIF left prox humerus POD#1  Patient resting without complaints.  No chest pain, SOB, N/V.    T(C): 37.2 (06-10-20 @ 09:20), Max: 37.2 (06-10-20 @ 09:20)  HR: 82 (06-10-20 @ 09:20) (63 - 106)  BP: 97/59 (06-10-20 @ 09:20) (82/50 - 117/62)  RR: 18 (06-10-20 @ 09:20) (14 - 18)  SpO2: 95% (06-10-20 @ 09:20) (93% - 100%)  Wt(kg): --    Exam:  Alert and Oriented, No Acute Distress  Cards: +S1/S2, RRR  Pulm: CTAB  LUE: n/v intact, moving all digits, in sling NWB  Lower Extremities:  Calves Soft, Non-tender bilaterally  +PF/DF/EHL/FHL  SILT  +DP Pulse                        9.5    8.83  )-----------( 220      ( 10 Sen 2020 05:52 )             29.6    06-10    142  |  105  |  8   ----------------------------<  93  5.0   |  27  |  0.67    Ca    8.9      10 Sen 2020 05:52    TPro  6.9  /  Alb  4.2  /  TBili  0.8  /  DBili  x   /  AST  25  /  ALT  20  /  AlkPhos  67  06-08

## 2020-06-10 NOTE — PROVIDER CONTACT NOTE (OTHER) - ASSESSMENT
bp 88/55 with the machine and 88/50 manually - pt denied chest pain and denied SOB- pt asymptomatic- Alert and oriented x4- speaking appropriately. there was no iv abx ordered post op and if it was ok to start heparin subcut tonight

## 2020-06-10 NOTE — PHYSICAL THERAPY INITIAL EVALUATION ADULT - GENERAL OBSERVATIONS, REHAB EVAL
Pt rec;ed supine in bed, +sling to LE, +IV, +grace catheter, robyn PT f/u session well w/o adverse reactio

## 2020-06-10 NOTE — OCCUPATIONAL THERAPY INITIAL EVALUATION ADULT - FINE MOTOR COORDINATION TRAINING, OT EVAL
Goal: Pt will independently manipulate buttons/zippers/fasteners on clothing in 4 weeks to increase independence for ADL performance

## 2020-06-10 NOTE — PROGRESS NOTE ADULT - SUBJECTIVE AND OBJECTIVE BOX
78 y/o F no PMH presents to the ED with c/o L arm pain, bruising and swelling s/p fall last week. Pt was seen at [facility], had cardiac w/u and XR which showed humeral fx, pt placed in sling and given outpt f/u for this week. Pt saw her PMD today who referred her to [facility] ED for eval and likely admit with Dr. Parker for surgery. Denies acutely worsening pain, numbness, tingling to arm, fevers, chills, n/v/d. Has been taking tylenol for pain at home. Patient scheduled for an Open reduction and internal fixation of the left humerus. Patient seen resting comfirtably. Denies any chest pain or shortness of breath incentive spirometry        PAST MEDICAL & SURGICAL HISTORY:  Constipation  No significant past surgical history        MEDICATIONS  (STANDING):  ascorbic acid 500 milliGRAM(s) Oral two times a day  dicyclomine 20 milliGRAM(s) Oral daily  ferrous    sulfate 325 milliGRAM(s) Oral three times a day with meals  folic acid 1 milliGRAM(s) Oral daily  lactated ringers. 1000 milliLiter(s) (100 mL/Hr) IV Continuous <Continuous>  multivitamin 1 Tablet(s) Oral daily  potassium chloride    Tablet ER 20 milliEquivalent(s) Oral every 2 hours    MEDICATIONS  (PRN):  HYDROmorphone  Injectable 0.5 milliGRAM(s) IV Push every 6 hours PRN Severe Pain (7 - 10)  magnesium hydroxide Suspension 30 milliLiter(s) Oral daily PRN Constipation  oxyCODONE    IR 10 milliGRAM(s) Oral every 4 hours PRN Moderate Pain (4 - 6)  oxyCODONE    IR 5 milliGRAM(s) Oral every 4 hours PRN Mild Pain (1 - 3)      T(C): 37.2 (06-10-20 @ 09:20), Max: 37.2 (06-10-20 @ 09:20)  HR: 82 (06-10-20 @ 09:20) (63 - 106)  BP: 97/59 (06-10-20 @ 09:20) (82/50 - 117/62)  RR: 18 (06-10-20 @ 09:20) (14 - 18)  SpO2: 95% (06-10-20 @ 09:20) (93% - 100%)    PHYSICAL EXAM:  GENERAL: NAD, well-groomed, well-developed  HEAD:  Atraumatic, Normocephalic  EYES: EOMI, PERRLA, conjunctiva and sclera clear  ENMT: No tonsillar erythema, exudates, or enlargement; Moist mucous membranes, Good dentition, No lesions  NECK: Supple, No JVD, Normal thyroid  NERVOUS SYSTEM:  Alert & Oriented X3, Good concentration; Motor Strength 5/5 B/L upper and lower extremities; DTRs 2+ intact and symmetric  CHEST/LUNG: Clear to percussion bilaterally; No rales, rhonchi, wheezing, or rubs  HEART: Regular rate and rhythm; No murmurs, rubs, or gallops  ABDOMEN: Soft, Nontender, Nondistended; Bowel sounds present  EXTREMITIES:  2+ Peripheral Pulses, No clubbing, cyanosis, or edema  S/P     PROCEDURES:   ORIF, fracture, proximal humerus 2020   .                                                                                                                              LABS:        CBC Full  -  ( 10 Sen 2020 05:52 )  WBC Count : 8.83 K/uL  RBC Count : 3.00 M/uL  Hemoglobin : 9.5 g/dL  Hematocrit : 29.6 %  Platelet Count - Automated : 220 K/uL  Mean Cell Volume : 98.7 fl  Mean Cell Hemoglobin : 31.7 pg  Mean Cell Hemoglobin Concentration : 32.1 gm/dL  Auto Neutrophil # : 5.74 K/uL  Auto Lymphocyte # : 1.99 K/uL  Auto Monocyte # : 1.02 K/uL  Auto Eosinophil # : 0.01 K/uL  Auto Basophil # : 0.02 K/uL  Auto Neutrophil % : 65.0 %  Auto Lymphocyte % : 22.5 %  Auto Monocyte % : 11.6 %  Auto Eosinophil % : 0.1 %  Auto Basophil % : 0.2 %    06-10    142  |  105  |  8   ----------------------------<  93  5.0   |  27  |  0.67    Ca    8.9      10 Sen 2020 05:52        PT/INR - ( 2020 06:00 )   PT: 11.0 sec;   INR: 0.96 ratio         PTT - ( 2020 06:00 )  PTT:28.3 sec  Urinalysis Basic - ( 2020 06:01 )    Color: Yellow / Appearance: Clear / S.027 / pH: x  Gluc: x / Ketone: Large  / Bili: Negative / Urobili: 2 mg/dL   Blood: x / Protein: 30 mg/dL / Nitrite: Negative   Leuk Esterase: Negative / RBC: 1 /hpf / WBC 3 /HPF   Sq Epi: x / Non Sq Epi: 2 / Bacteria: Negative              CBC Full  -  ( 2020 15:11 )  WBC Count : 7.98 K/uL  RBC Count : 3.65 M/uL  Hemoglobin : 11.5 g/dL  Hematocrit : 35.7 %  Platelet Count - Automated : 262 K/uL  Mean Cell Volume : 97.8 fl  Mean Cell Hemoglobin : 31.5 pg  Mean Cell Hemoglobin Concentration : 32.2 gm/dL  Auto Neutrophil # : x  Auto Lymphocyte # : x  Auto Monocyte # : x  Auto Eosinophil # : x  Auto Basophil # : x  Auto Neutrophil % : x  Auto Lymphocyte % : x  Auto Monocyte % : x  Auto Eosinophil % : x  Auto Basophil % : x        141  |  100  |  8   ----------------------------<  133<H>  3.5   |  20<L>  |  0.62    Ca    8.8      2020 15:11    TPro  6.9  /  Alb  4.2  /  TBili  0.8  /  DBili  x   /  AST  25  /  ALT  20  /  AlkPhos  67  06-08    LIVER FUNCTIONS - ( 2020 12:59 )  Alb: 4.2 g/dL / Pro: 6.9 g/dL / ALK PHOS: 67 U/L / ALT: 20 U/L / AST: 25 U/L / GGT: x           PT/INR - ( 2020 06:00 )   PT: 11.0 sec;   INR: 0.96 ratio         PTT - ( 2020 06:00 )  PTT:28.3 sec  Urinalysis Basic - ( 2020 06:01 )    Color: Yellow / Appearance: Clear / S.027 / pH: x  Gluc: x / Ketone: Large  / Bili: Negative / Urobili: 2 mg/dL   Blood: x / Protein: 30 mg/dL / Nitrite: Negative   Leuk Esterase: Negative / RBC: 1 /hpf / WBC 3 /HPF   Sq Epi: x / Non Sq Epi: 2 / Bacteria: Negative                            11.9   7.96  )-----------( 266      ( 2020 12:59 )             37.2     06-08    141  |  99  |  11  ----------------------------<  74  3.4<L>   |  25  |  0.71    Ca    9.7      2020 12:59    TPro  6.9  /  Alb  4.2  /  TBili  0.8  /  DBili  x   /  AST  25  /  ALT  20  /  AlkPhos  67  06-08    PT/INR - ( 2020 12:59 )   PT: 11.2 sec;   INR: 0.97 ratio         PTT - ( 2020 12:59 )  PTT:27.0 sec    CAPILLARY BLOOD GLUCOSE    EKG   NSR@ 80

## 2020-06-10 NOTE — PHYSICAL THERAPY INITIAL EVALUATION ADULT - PERTINENT HX OF CURRENT PROBLEM, REHAB EVAL
79yoF no PMH presents to the ED w/ c/o L arm pain, bruising & swelling s/p fall last week.  XR showed humeral fx, pt placed in sling & given outpt f/u for this week. Pt saw her PMD today who referred her to [facility] ED for eval. Pt now s/p L proximal humerus ORIF 6/9/20.

## 2020-06-10 NOTE — OCCUPATIONAL THERAPY INITIAL EVALUATION ADULT - IMPAIRED TRANSFERS: SIT/STAND, REHAB EVAL
decreased flexibility/decreased ROM/impaired postural control/impaired balance/pain/decreased strength

## 2020-06-10 NOTE — OCCUPATIONAL THERAPY INITIAL EVALUATION ADULT - LIVES WITH, PROFILE
alone/As per patient, lives alone in first floor apt in [place], +6 steps to enter. tub shower no grab bars. Pt states she has home health aide 6hrs a day x 7 days a week who assists with cooking, cleaning, laundry, home maintenance

## 2020-06-10 NOTE — PHYSICAL THERAPY INITIAL EVALUATION ADULT - ADDITIONAL COMMENTS
Pt lives in a first floor apartment alone, no steps to negotiate. Pt states she has a HHA 6 hours/day but previously was (I) w/ functional mobility & ADL w/o AD.

## 2020-06-10 NOTE — PHYSICAL THERAPY INITIAL EVALUATION ADULT - PLANNED THERAPY INTERVENTIONS, PT EVAL
transfer training/gait training/bed mobility training/balance training/wheelchair management/propulsion training

## 2020-06-10 NOTE — OCCUPATIONAL THERAPY INITIAL EVALUATION ADULT - PERTINENT HX OF CURRENT PROBLEM, REHAB EVAL
78yo F community ambulator with AD who presents to [facility] ED c/o of left shoulder pain s/p MF. Pt states slipped and fell on Thursday. Denies HS/LOC. Localizing pain to left shoulder, denies radiation of pain elsewhere. States ability to ambulate immediately following the injury.

## 2020-06-10 NOTE — PROGRESS NOTE ADULT - ASSESSMENT
78 yo woman with a hx of a fall presents with a left proximal humerus fx. Patient underwent  Open reduction and internal fixation of proximal humerus.

## 2020-06-10 NOTE — OCCUPATIONAL THERAPY INITIAL EVALUATION ADULT - PLANNED THERAPY INTERVENTIONS, OT EVAL
balance training/bed mobility training/transfer training/ADL retraining balance training/bed mobility training/fine motor coordination training/transfer training/ADL retraining

## 2020-06-10 NOTE — PROGRESS NOTE ADULT - ASSESSMENT
80 y/o fm s/p ORIF left proximal humerus POD#1, PT/OT  Niki Rodrigues PA-C  Orthopaedic Surgery  Team pager [phone]  [facility] [phone]  [phone]

## 2020-06-10 NOTE — OCCUPATIONAL THERAPY INITIAL EVALUATION ADULT - ADDITIONAL COMMENTS
CT L shoulder 6/8/2020: Comminuted, impacted and mildly displaced proximal humerus fractures as above.  XRay L shoulder 6/9/2020: S/P ORIF of left proximal humerus fracture with lateral plate and multiple screws. Alignment is near-anatomic. No glenohumeral dislocation. Moderate acromioclavicular joint arthrosis.

## 2020-06-10 NOTE — OCCUPATIONAL THERAPY INITIAL EVALUATION ADULT - GENERAL OBSERVATIONS, REHAB EVAL
Pt received semisupine in bed, just returned to chair, A+Ox4, B[place] speaking, deferred  phone, able to communicate with therapist in English, NWB LUE in sling, +ecchymoses and edema noted proximal to distal LUE

## 2020-06-10 NOTE — OCCUPATIONAL THERAPY INITIAL EVALUATION ADULT - MD ORDER
OT Evaluate and Treat  NWB LUE in sling OT Evaluate and Treat  NWB LUE in sling  as per ortho PA KIKI Rodrigues, VADIM elbow/wrist/digits only, nothing at shoulder

## 2020-06-10 NOTE — OCCUPATIONAL THERAPY INITIAL EVALUATION ADULT - NS ASR FOLLOW COMMAND OT EVAL
increased time for processing 2/2 to language/able to follow single-step instructions/100% of the time

## 2020-06-10 NOTE — PHYSICAL THERAPY INITIAL EVALUATION ADULT - MANUAL MUSCLE TESTING RESULTS, REHAB EVAL
B GHADA CALDERON 3/5, WILLOW pt able to move fingers, not allowing PT to assess PROM/strength due to pain

## 2020-06-10 NOTE — OCCUPATIONAL THERAPY INITIAL EVALUATION ADULT - RANGE OF MOTION EXAMINATION, UPPER EXTREMITY
L digits/wrist AROM WFL; L shoulder/elbow not tested 2/2 to pain/Right UE Active ROM was WFL (within functional limits)

## 2020-06-10 NOTE — OCCUPATIONAL THERAPY INITIAL EVALUATION ADULT - DIAGNOSIS, OT EVAL
Pt currently presents with decreased balance, AROM, flexibility, strength and grasp/manipulation L hand limiting independence with ADLs and functional mobility.

## 2020-06-10 NOTE — PROGRESS NOTE ADULT - ASSESSMENT
80 yo woman with a hx of a fall presents with a left proximal humerus fx. Patient underwent  Open reduction and internal fixation of proximal humerus.

## 2020-06-11 DIAGNOSIS — S42.292A OTHER DISPLACED FRACTURE OF UPPER END OF LEFT HUMERUS, INITIAL ENCOUNTER FOR CLOSED FRACTURE: ICD-10-CM

## 2020-06-11 RX ADMIN — HEPARIN SODIUM 5000 UNIT(S): 5000 INJECTION INTRAVENOUS; SUBCUTANEOUS at 21:11

## 2020-06-11 RX ADMIN — HEPARIN SODIUM 5000 UNIT(S): 5000 INJECTION INTRAVENOUS; SUBCUTANEOUS at 06:05

## 2020-06-11 RX ADMIN — HEPARIN SODIUM 5000 UNIT(S): 5000 INJECTION INTRAVENOUS; SUBCUTANEOUS at 15:42

## 2020-06-11 RX ADMIN — SENNA PLUS 2 TABLET(S): 8.6 TABLET ORAL at 21:11

## 2020-06-11 NOTE — PROVIDER CONTACT NOTE (OTHER) - ASSESSMENT
VSS, pt c/o severe pain to L arm, refusing to take tylenol stated she had enough and doesn't want to damage her liver, requesting IV pain meds, explained that pt has oxycodone ordered and it is recommended she try that first, pt states she is very nervous that she will have an allergic reaction, ice applied to arm, pt requesting benadryl and states she has taken it at home before and needs to take before pain medication since she states she has never taken oxycodone before.

## 2020-06-11 NOTE — PROGRESS NOTE ADULT - SUBJECTIVE AND OBJECTIVE BOX
78 y/o F no PMH presents to the ED with c/o L arm pain, bruising and swelling s/p fall last week. Pt was seen at [facility], had cardiac w/u and XR which showed humeral fx, pt placed in sling and given outpt f/u for this week. Pt saw her PMD today who referred her to [facility] ED for eval and likely admit with Dr. Parker for surgery. Denies acutely worsening pain, numbness, tingling to arm, fevers, chills, n/v/d. Has been taking tylenol for pain at home. Patient scheduled for an Open reduction and internal fixation of the left humerus. Patient seen resting comfirtably. Denies any chest pain or shortness of breath incentive spirometry        PAST MEDICAL & SURGICAL HISTORY:  Constipation  No significant past surgical history        MEDICATIONS  (STANDING):  ascorbic acid 500 milliGRAM(s) Oral two times a day  dicyclomine 20 milliGRAM(s) Oral daily  ferrous    sulfate 325 milliGRAM(s) Oral three times a day with meals  folic acid 1 milliGRAM(s) Oral daily  lactated ringers. 1000 milliLiter(s) (100 mL/Hr) IV Continuous <Continuous>  multivitamin 1 Tablet(s) Oral daily  potassium chloride    Tablet ER 20 milliEquivalent(s) Oral every 2 hours    MEDICATIONS  (PRN):  HYDROmorphone  Injectable 0.5 milliGRAM(s) IV Push every 6 hours PRN Severe Pain (7 - 10)  magnesium hydroxide Suspension 30 milliLiter(s) Oral daily PRN Constipation  oxyCODONE    IR 10 milliGRAM(s) Oral every 4 hours PRN Moderate Pain (4 - 6)  oxyCODONE    IR 5 milliGRAM(s) Oral every 4 hours PRN Mild Pain (1 - 3)        Vital Signs Last 24 Hrs  T(C): 36.9 (2020 15:54), Max: 36.9 (2020 04:39)  T(F): 98.4 (2020 15:54), Max: 98.4 (2020 04:39)  HR: 90 (2020 15:54) (65 - 90)  BP: 127/79 (2020 15:54) (108/66 - 127/79)  BP(mean): --  RR: 18 (2020 15:54) (18 - 18)  SpO2: 95% (2020 15:54) (95% - 97%)        PHYSICAL EXAM:  GENERAL: NAD, well-groomed, well-developed  HEAD:  Atraumatic, Normocephalic  EYES: EOMI, PERRLA, conjunctiva and sclera clear  ENMT: No tonsillar erythema, exudates, or enlargement; Moist mucous membranes, Good dentition, No lesions  NECK: Supple, No JVD, Normal thyroid  NERVOUS SYSTEM:  Alert & Oriented X3, Good concentration; Motor Strength 5/5 B/L upper and lower extremities; DTRs 2+ intact and symmetric  CHEST/LUNG: Clear to percussion bilaterally; No rales, rhonchi, wheezing, or rubs  HEART: Regular rate and rhythm; No murmurs, rubs, or gallops  ABDOMEN: Soft, Nontender, Nondistended; Bowel sounds present  EXTREMITIES:  2+ Peripheral Pulses, No clubbing, cyanosis, or edema  S/P     PROCEDURES:   ORIF, fracture, proximal humerus 2020   .                                                                                                                              LABS:              CBC Full  -  ( 10 Sen 2020 05:52 )  WBC Count : 8.83 K/uL  RBC Count : 3.00 M/uL  Hemoglobin : 9.5 g/dL  Hematocrit : 29.6 %  Platelet Count - Automated : 220 K/uL  Mean Cell Volume : 98.7 fl  Mean Cell Hemoglobin : 31.7 pg  Mean Cell Hemoglobin Concentration : 32.1 gm/dL  Auto Neutrophil # : 5.74 K/uL  Auto Lymphocyte # : 1.99 K/uL  Auto Monocyte # : 1.02 K/uL  Auto Eosinophil # : 0.01 K/uL  Auto Basophil # : 0.02 K/uL  Auto Neutrophil % : 65.0 %  Auto Lymphocyte % : 22.5 %  Auto Monocyte % : 11.6 %  Auto Eosinophil % : 0.1 %  Auto Basophil % : 0.2 %    06-10    142  |  105  |  8   ----------------------------<  93  5.0   |  27  |  0.67    Ca    8.9      10 Sen 2020 05:52              CBC Full  -  ( 2020 15:11 )  WBC Count : 7.98 K/uL  RBC Count : 3.65 M/uL  Hemoglobin : 11.5 g/dL  Hematocrit : 35.7 %  Platelet Count - Automated : 262 K/uL  Mean Cell Volume : 97.8 fl  Mean Cell Hemoglobin : 31.5 pg  Mean Cell Hemoglobin Concentration : 32.2 gm/dL  Auto Neutrophil # : x  Auto Lymphocyte # : x  Auto Monocyte # : x  Auto Eosinophil # : x  Auto Basophil # : x  Auto Neutrophil % : x  Auto Lymphocyte % : x  Auto Monocyte % : x  Auto Eosinophil % : x  Auto Basophil % : x        141  |  100  |  8   ----------------------------<  133<H>  3.5   |  20<L>  |  0.62    Ca    8.8      2020 15:11    TPro  6.9  /  Alb  4.2  /  TBili  0.8  /  DBili  x   /  AST  25  /  ALT  20  /  AlkPhos  67  06-08    LIVER FUNCTIONS - ( 2020 12:59 )  Alb: 4.2 g/dL / Pro: 6.9 g/dL / ALK PHOS: 67 U/L / ALT: 20 U/L / AST: 25 U/L / GGT: x           PT/INR - ( 2020 06:00 )   PT: 11.0 sec;   INR: 0.96 ratio         PTT - ( 2020 06:00 )  PTT:28.3 sec  Urinalysis Basic - ( 2020 06:01 )    Color: Yellow / Appearance: Clear / S.027 / pH: x  Gluc: x / Ketone: Large  / Bili: Negative / Urobili: 2 mg/dL   Blood: x / Protein: 30 mg/dL / Nitrite: Negative   Leuk Esterase: Negative / RBC: 1 /hpf / WBC 3 /HPF   Sq Epi: x / Non Sq Epi: 2 / Bacteria: Negative                            11.9   7.96  )-----------( 266      ( 2020 12:59 )             37.2     06-08    141  |  99  |  11  ----------------------------<  74  3.4<L>   |  25  |  0.71    Ca    9.7      2020 12:59    TPro  6.9  /  Alb  4.2  /  TBili  0.8  /  DBili  x   /  AST  25  /  ALT  20  /  AlkPhos  67  06-08    PT/INR - ( 2020 12:59 )   PT: 11.2 sec;   INR: 0.97 ratio         PTT - ( 2020 12:59 )  PTT:27.0 sec    CAPILLARY BLOOD GLUCOSE    EKG   NSR@ 80

## 2020-06-11 NOTE — PROGRESS NOTE ADULT - SUBJECTIVE AND OBJECTIVE BOX
Ortho POD # 2  No complaints; Denies SOB/CP/N/V; Pain controlled; No acute events overnight.    T(C): 36.9 (06-11-20 @ 08:54)  T(F): 98.4 (06-11-20 @ 08:54)  HR: 75 (06-11-20 @ 08:54)  BP: 109/68 (06-11-20 @ 08:54)  RR: 18 (06-11-20 @ 08:54)  SpO2: 95% (06-11-20 @ 08:54)  Wt(kg): --    Physical Exam  Gen: NAD    RUE:   Dressing CDI; Sling on  Drain yes [ ]  No [x ]  Sensation/R/U/M/AIN/PIN grossly intact  2+ Radial pulse   Cap refill < 2 secs                          9.5<L>  8.83  )-----------( 220      ( 10 Sen 2020 05:52 )             29.6<L>    06-10    142  |  105  |  8   ----------------------------<  93  5.0   |  27  |  0.67

## 2020-06-11 NOTE — PROVIDER CONTACT NOTE (OTHER) - ACTION/TREATMENT ORDERED:
Dom carrillo aware of situation, Benadryl ordered, no other interventions at this time
MD stated he will order an IV bolus and increase the rate of her IVFs - he will order abx and that it was ok to start hep subcut tonight. will monitor.

## 2020-06-11 NOTE — PROGRESS NOTE ADULT - ASSESSMENT
80 yo woman with a hx of a fall presents with a left proximal humerus fx. Patient underwent  Open reduction and internal fixation of proximal humerus.    Mild blood l oss anemia, no transfusion needed

## 2020-06-11 NOTE — PROGRESS NOTE ADULT - ASSESSMENT
Assessment: s/p Open Reduction Internal Fixation / Surgical Repair L Humerus    Plan:   CHRISTIE  LUE  Heparin S/Q  dispo: anticipate home

## 2020-06-12 VITALS
DIASTOLIC BLOOD PRESSURE: 59 MMHG | SYSTOLIC BLOOD PRESSURE: 100 MMHG | HEART RATE: 93 BPM | OXYGEN SATURATION: 97 % | RESPIRATION RATE: 18 BRPM | TEMPERATURE: 98 F

## 2020-06-12 PROCEDURE — 71045 X-RAY EXAM CHEST 1 VIEW: CPT

## 2020-06-12 PROCEDURE — 93005 ELECTROCARDIOGRAM TRACING: CPT

## 2020-06-12 PROCEDURE — 86901 BLOOD TYPING SEROLOGIC RH(D): CPT

## 2020-06-12 PROCEDURE — 97116 GAIT TRAINING THERAPY: CPT

## 2020-06-12 PROCEDURE — 80053 COMPREHEN METABOLIC PANEL: CPT

## 2020-06-12 PROCEDURE — U0003: CPT

## 2020-06-12 PROCEDURE — 76377 3D RENDER W/INTRP POSTPROCES: CPT

## 2020-06-12 PROCEDURE — 99285 EMERGENCY DEPT VISIT HI MDM: CPT

## 2020-06-12 PROCEDURE — 86850 RBC ANTIBODY SCREEN: CPT

## 2020-06-12 PROCEDURE — 97165 OT EVAL LOW COMPLEX 30 MIN: CPT

## 2020-06-12 PROCEDURE — 73060 X-RAY EXAM OF HUMERUS: CPT

## 2020-06-12 PROCEDURE — 73020 X-RAY EXAM OF SHOULDER: CPT

## 2020-06-12 PROCEDURE — 85027 COMPLETE CBC AUTOMATED: CPT

## 2020-06-12 PROCEDURE — C1713: CPT

## 2020-06-12 PROCEDURE — 97161 PT EVAL LOW COMPLEX 20 MIN: CPT

## 2020-06-12 PROCEDURE — 85610 PROTHROMBIN TIME: CPT

## 2020-06-12 PROCEDURE — 85730 THROMBOPLASTIN TIME PARTIAL: CPT

## 2020-06-12 PROCEDURE — 97110 THERAPEUTIC EXERCISES: CPT

## 2020-06-12 PROCEDURE — 86900 BLOOD TYPING SEROLOGIC ABO: CPT

## 2020-06-12 PROCEDURE — 80048 BASIC METABOLIC PNL TOTAL CA: CPT

## 2020-06-12 PROCEDURE — 73200 CT UPPER EXTREMITY W/O DYE: CPT

## 2020-06-12 PROCEDURE — 81001 URINALYSIS AUTO W/SCOPE: CPT

## 2020-06-12 PROCEDURE — 76000 FLUOROSCOPY <1 HR PHYS/QHP: CPT

## 2020-06-12 PROCEDURE — 73030 X-RAY EXAM OF SHOULDER: CPT

## 2020-06-12 RX ORDER — LANOLIN ALCOHOL/MO/W.PET/CERES
1 CREAM (GRAM) TOPICAL
Qty: 0 | Refills: 0 | DISCHARGE
Start: 2020-06-12

## 2020-06-12 RX ORDER — POLYETHYLENE GLYCOL 3350 17 G/17G
17 POWDER, FOR SOLUTION ORAL AT BEDTIME
Refills: 0 | Status: DISCONTINUED | OUTPATIENT
Start: 2020-06-12 | End: 2020-06-12

## 2020-06-12 RX ORDER — SENNA PLUS 8.6 MG/1
2 TABLET ORAL
Qty: 0 | Refills: 0 | DISCHARGE
Start: 2020-06-12

## 2020-06-12 RX ORDER — ASPIRIN/CALCIUM CARB/MAGNESIUM 324 MG
1 TABLET ORAL
Qty: 0 | Refills: 0 | DISCHARGE

## 2020-06-12 RX ORDER — POLYETHYLENE GLYCOL 3350 17 G/17G
17 POWDER, FOR SOLUTION ORAL
Qty: 0 | Refills: 0 | DISCHARGE
Start: 2020-06-12

## 2020-06-12 RX ORDER — DIPHENHYDRAMINE HCL 50 MG
1 CAPSULE ORAL
Qty: 0 | Refills: 0 | DISCHARGE
Start: 2020-06-12

## 2020-06-12 RX ORDER — OXYCODONE HYDROCHLORIDE 5 MG/1
2.5 TABLET ORAL
Qty: 0 | Refills: 0 | DISCHARGE
Start: 2020-06-12

## 2020-06-12 RX ORDER — OXYCODONE HYDROCHLORIDE 5 MG/1
1 TABLET ORAL
Qty: 0 | Refills: 0 | DISCHARGE
Start: 2020-06-12

## 2020-06-12 RX ORDER — ACETAMINOPHEN 500 MG
3 TABLET ORAL
Qty: 0 | Refills: 0 | DISCHARGE
Start: 2020-06-12

## 2020-06-12 RX ADMIN — HEPARIN SODIUM 5000 UNIT(S): 5000 INJECTION INTRAVENOUS; SUBCUTANEOUS at 13:15

## 2020-06-12 RX ADMIN — HEPARIN SODIUM 5000 UNIT(S): 5000 INJECTION INTRAVENOUS; SUBCUTANEOUS at 06:15

## 2020-06-12 RX ADMIN — Medication 975 MILLIGRAM(S): at 13:15

## 2020-06-12 NOTE — PROGRESS NOTE ADULT - ASSESSMENT
79F sp L proximal humerus ORIF    Neuro: Pain control  Resp: IS  GI: Regular diet, bowel reg  MSK: WBAT, PT/OT  Heme: DVT PPX  DIspo: Rehab    Ortho [id number]

## 2020-06-12 NOTE — PROGRESS NOTE ADULT - SUBJECTIVE AND OBJECTIVE BOX
80 y/o F no PMH presents to the ED with c/o L arm pain, bruising and swelling s/p fall last week. Pt was seen at [facility], had cardiac w/u and XR which showed humeral fx, pt placed in sling and given outpt f/u for this week. Pt saw her PMD today who referred her to [facility] ED for eval and likely admit with Dr. Parker for surgery. Denies acutely worsening pain, numbness, tingling to arm, fevers, chills, n/v/d. Has been taking tylenol for pain at home. Patient scheduled for an Open reduction and internal fixation of the left humerus. Patient seen resting comfirtably. Denies any chest pain or shortness of breath incentive spirometry        PAST MEDICAL & SURGICAL HISTORY:  Constipation  No significant past surgical history        MEDICATIONS  (STANDING):  ascorbic acid 500 milliGRAM(s) Oral two times a day  dicyclomine 20 milliGRAM(s) Oral daily  ferrous    sulfate 325 milliGRAM(s) Oral three times a day with meals  folic acid 1 milliGRAM(s) Oral daily  lactated ringers. 1000 milliLiter(s) (100 mL/Hr) IV Continuous <Continuous>  multivitamin 1 Tablet(s) Oral daily  potassium chloride    Tablet ER 20 milliEquivalent(s) Oral every 2 hours    MEDICATIONS  (PRN):  HYDROmorphone  Injectable 0.5 milliGRAM(s) IV Push every 6 hours PRN Severe Pain (7 - 10)  magnesium hydroxide Suspension 30 milliLiter(s) Oral daily PRN Constipation  oxyCODONE    IR 10 milliGRAM(s) Oral every 4 hours PRN Moderate Pain (4 - 6)  oxyCODONE    IR 5 milliGRAM(s) Oral every 4 hours PRN Mild Pain (1 - 3)      Vital Signs Last 24 Hrs  T(C): 36.8 (2020 12:04), Max: 36.9 (2020 05:24)  T(F): 98.2 (2020 12:04), Max: 98.5 (2020 05:24)  HR: 93 (2020 12:04) (69 - 93)  BP: 100/59 (2020 12:04) (100/59 - 104/60)  BP(mean): --  RR: 18 (2020 12:04) (18 - 18)  SpO2: 97% (2020 12:04) (97% - 98%)          PHYSICAL EXAM:  GENERAL: NAD, well-groomed, well-developed  HEAD:  Atraumatic, Normocephalic  EYES: EOMI, PERRLA, conjunctiva and sclera clear  ENMT: No tonsillar erythema, exudates, or enlargement; Moist mucous membranes, Good dentition, No lesions  NECK: Supple, No JVD, Normal thyroid  NERVOUS SYSTEM:  Alert & Oriented X3, Good concentration; Motor Strength 5/5 B/L upper and lower extremities; DTRs 2+ intact and symmetric  CHEST/LUNG: Clear to percussion bilaterally; No rales, rhonchi, wheezing, or rubs  HEART: Regular rate and rhythm; No murmurs, rubs, or gallops  ABDOMEN: Soft, Nontender, Nondistended; Bowel sounds present  EXTREMITIES:  2+ Peripheral Pulses, No clubbing, cyanosis, or edema  S/P     PROCEDURES:   ORIF, fracture, proximal humerus 2020   .                                                                                                                              LABS:              CBC Full  -  ( 10 Sen 2020 05:52 )  WBC Count : 8.83 K/uL  RBC Count : 3.00 M/uL  Hemoglobin : 9.5 g/dL  Hematocrit : 29.6 %  Platelet Count - Automated : 220 K/uL  Mean Cell Volume : 98.7 fl  Mean Cell Hemoglobin : 31.7 pg  Mean Cell Hemoglobin Concentration : 32.1 gm/dL  Auto Neutrophil # : 5.74 K/uL  Auto Lymphocyte # : 1.99 K/uL  Auto Monocyte # : 1.02 K/uL  Auto Eosinophil # : 0.01 K/uL  Auto Basophil # : 0.02 K/uL  Auto Neutrophil % : 65.0 %  Auto Lymphocyte % : 22.5 %  Auto Monocyte % : 11.6 %  Auto Eosinophil % : 0.1 %  Auto Basophil % : 0.2 %    06-10    142  |  105  |  8   ----------------------------<  93  5.0   |  27  |  0.67    Ca    8.9      10 Sen 2020 05:52              CBC Full  -  ( 2020 15:11 )  WBC Count : 7.98 K/uL  RBC Count : 3.65 M/uL  Hemoglobin : 11.5 g/dL  Hematocrit : 35.7 %  Platelet Count - Automated : 262 K/uL  Mean Cell Volume : 97.8 fl  Mean Cell Hemoglobin : 31.5 pg  Mean Cell Hemoglobin Concentration : 32.2 gm/dL  Auto Neutrophil # : x  Auto Lymphocyte # : x  Auto Monocyte # : x  Auto Eosinophil # : x  Auto Basophil # : x  Auto Neutrophil % : x  Auto Lymphocyte % : x  Auto Monocyte % : x  Auto Eosinophil % : x  Auto Basophil % : x        141  |  100  |  8   ----------------------------<  133<H>  3.5   |  20<L>  |  0.62    Ca    8.8      2020 15:11    TPro  6.9  /  Alb  4.2  /  TBili  0.8  /  DBili  x   /  AST  25  /  ALT  20  /  AlkPhos  67  06-08    LIVER FUNCTIONS - ( 2020 12:59 )  Alb: 4.2 g/dL / Pro: 6.9 g/dL / ALK PHOS: 67 U/L / ALT: 20 U/L / AST: 25 U/L / GGT: x           PT/INR - ( 2020 06:00 )   PT: 11.0 sec;   INR: 0.96 ratio         PTT - ( 2020 06:00 )  PTT:28.3 sec  Urinalysis Basic - ( 2020 06:01 )    Color: Yellow / Appearance: Clear / S.027 / pH: x  Gluc: x / Ketone: Large  / Bili: Negative / Urobili: 2 mg/dL   Blood: x / Protein: 30 mg/dL / Nitrite: Negative   Leuk Esterase: Negative / RBC: 1 /hpf / WBC 3 /HPF   Sq Epi: x / Non Sq Epi: 2 / Bacteria: Negative                            11.9   7.96  )-----------( 266      ( 2020 12:59 )             37.2     06-08    141  |  99  |  11  ----------------------------<  74  3.4<L>   |  25  |  0.71    Ca    9.7      2020 12:59    TPro  6.9  /  Alb  4.2  /  TBili  0.8  /  DBili  x   /  AST  25  /  ALT  20  /  AlkPhos  67  06-08    PT/INR - ( 2020 12:59 )   PT: 11.2 sec;   INR: 0.97 ratio         PTT - ( 2020 12:59 )  PTT:27.0 sec    CAPILLARY BLOOD GLUCOSE    EKG   NSR@ 80

## 2020-06-12 NOTE — DISCHARGE NOTE NURSING/CASE MANAGEMENT/SOCIAL WORK - PATIENT PORTAL LINK FT
You can access the FollowMyHealth Patient Portal offered by [facility] by registering at the following website: [email or web address]. By joining [facility]’s FollowMyHealth portal, you will also be able to view your health information using other applications (apps) compatible with our system.

## 2020-06-12 NOTE — DISCHARGE NOTE PROVIDER - CARE PROVIDER_API CALL
Kendall Parker  ORTHOPAEDIC SURGERY  [address] [place] [address], [address]  [place] [address]  Phone: [phone]  Fax: [phone]  Follow Up Time:

## 2020-06-12 NOTE — PROGRESS NOTE ADULT - PROBLEM/PLAN-2
DISPLAY PLAN FREE TEXT
18

## 2020-06-12 NOTE — DISCHARGE NOTE PROVIDER - NSDCMRMEDTOKEN_GEN_ALL_CORE_FT
acetaminophen 325 mg oral tablet: 3 tab(s) orally every 8 hours x 1 week then consider switching to prn  diphenhydrAMINE 25 mg oral capsule: 1 cap(s) orally every 4 hours, As needed, Rash and/or Itching  Ecotrin 325 mg oral delayed release tablet: 1 tab(s) orally once a day x 6 weeks for the prevention of clots    melatonin 3 mg oral tablet: 1 tab(s) orally once a day (at bedtime), As needed, Insomnia  oxyCODONE: 2.5 milligram(s) orally every 4 hours, As Needed moderate pain  oxyCODONE 5 mg oral tablet: 1 tab(s) orally every 4 hours, As needed, Severe Pain (7 - 10)  polyethylene glycol 3350 oral powder for reconstitution: 17 gram(s) orally once a day (at bedtime)  senna oral tablet: 2 tab(s) orally once a day (at bedtime)

## 2020-06-12 NOTE — DISCHARGE NOTE PROVIDER - NSDCFUADDINST_GEN_ALL_CORE_FT
Keep dressing clean. Change dressing as needed.  Non weight bearing in sling.    Take ecotrin 325mg po daily x 6 weeks for prevention of clots

## 2020-06-12 NOTE — DISCHARGE NOTE PROVIDER - NSDCCPCAREPLAN_GEN_ALL_CORE_FT
PRINCIPAL DISCHARGE DIAGNOSIS  Diagnosis: Humerus head fracture, left, closed, initial encounter  Assessment and Plan of Treatment: Humerus head fracture, left, closed, initial encounter

## 2020-06-12 NOTE — PROGRESS NOTE ADULT - REASON FOR ADMISSION
L proximal hum fx
L proximal humerus fracture
L proximal hum fx

## 2020-06-12 NOTE — PROGRESS NOTE ADULT - SUBJECTIVE AND OBJECTIVE BOX
Ortho Progress Note    S: Patient seen and examined. No acute events overnight. Pain well controlled with current regimen. Denies lightheadedness/dizziness, CP/SOB. Tolerating diet.       O:  Physical Exam:  Gen: Laying in bed, NAD, alert and oriented.   Resp: Unlabored breathing  Ext: EHL/FHL/TA/Sol intact          + SILT DP/SP/REYES/Sa/Tib          +DP, extremity WWP    Vital Signs Last 24 Hrs  T(C): 36.9 (12 Jun 2020 05:24), Max: 36.9 (11 Jun 2020 08:54)  T(F): 98.5 (12 Jun 2020 05:24), Max: 98.5 (12 Jun 2020 05:24)  HR: 69 (12 Jun 2020 05:24) (69 - 90)  BP: 104/60 (12 Jun 2020 05:24) (104/60 - 127/79)  BP(mean): --  RR: 18 (12 Jun 2020 05:24) (18 - 18)  SpO2: 98% (12 Jun 2020 05:24) (95% - 98%)

## 2020-06-12 NOTE — DISCHARGE NOTE PROVIDER - HOSPITAL COURSE
History of Present Illness:     Patient is a 79yFemale community ambulator with assistive devices who presents to [facility] ED w/ a c/o of left shoulder pain s/p MF. Patient states slipped and fell on thursday. Denies HS/LOC. Localizing pain to left shoulder, denies radiation of pain elsewhere. States ability to ambulate immediately following the injury. Denies any numbness or tingling. Denies having any other pain elsewhere. Denies any previous orthopaedic history. No other orthopaedic concerns at this time.                Allergies and Intolerances:          Allergies:    	penicillin: Drug, Unknown        Home Medications:     * Outpatient Medication Status not yet specified        .        Patient History:      Past Medical, Past Surgical, and Family History:    PAST MEDICAL HISTORY:    Constipation.         PAST SURGICAL HISTORY:    No significant past surgical history.         Admitted 6/8/20 with the dx of left proximal humerus fracture.  Medically cleared.  S/P ORIF left proximal humerus fracture on 6/9/20.  Patient tolerated procedure well.  Physical therapy  for ambulation non weight bearing LUE in sling.  PT recommends JACINTO.  Will transfer when bed available

## 2020-06-12 NOTE — DISCHARGE NOTE PROVIDER - NSDCACTIVITY_GEN_ALL_CORE
Do not drive or operate machinery/Walking - Outdoors allowed/Do not make important decisions/Stairs allowed/No heavy lifting/straining/Walking - Indoors allowed

## 2021-02-27 NOTE — ED PROVIDER NOTE - NS ED ATTENDING STATEMENT MOD
I have personally seen and examined this patient.  I have fully participated in the care of this patient. I have reviewed all pertinent clinical information, including history, physical exam, plan and the Resident’s note and agree except as noted.
Alcohol

## 2024-09-03 NOTE — PRE-OP CHECKLIST - PATIENT'S PERSONAL PROPERTY REMOVED
Left voicemail for patients wife that patients Physical Impairment Questionnaire was filled out, faxed and ready for pickup if they would like the original copy.     The form is also scanned into the patients chart.   
dentures/glasses

## 2024-10-22 NOTE — PATIENT PROFILE ADULT - DISASTER - NSASFALLNEEDSASSISTWITH_GEN_A_NUR
I was told that Dr. Prado is not accepting any new patients.  I am not comfortable with just accepting an appointment with anyone that happens to have an availability.  I Would rather have another referral to a specific provider please.     Thanking you in Advance.      Regards,  Saw   
standing/walking
No indicators present

## 2025-07-02 NOTE — CONSULT NOTE ADULT - PROBLEM SELECTOR PROBLEM 2
Patient notified that it is okay to take lasix. She questions how many as she was taking 40 mg twice daily. Per verbal instruction of Dr Calhoun, patient should start with one daily and monitor the swelling. To contact the office 7/8/25 with a progress report. We can send in script at that time if needed. Verbalizes understanding.    Pain